# Patient Record
Sex: FEMALE | Race: WHITE | NOT HISPANIC OR LATINO
[De-identification: names, ages, dates, MRNs, and addresses within clinical notes are randomized per-mention and may not be internally consistent; named-entity substitution may affect disease eponyms.]

---

## 2021-03-24 ENCOUNTER — APPOINTMENT (OUTPATIENT)
Dept: OBGYN | Facility: CLINIC | Age: 86
End: 2021-03-24
Payer: MEDICARE

## 2021-03-24 VITALS — DIASTOLIC BLOOD PRESSURE: 80 MMHG | SYSTOLIC BLOOD PRESSURE: 134 MMHG

## 2021-03-24 VITALS
BODY MASS INDEX: 26.5 KG/M2 | WEIGHT: 144 LBS | HEIGHT: 62 IN | SYSTOLIC BLOOD PRESSURE: 155 MMHG | DIASTOLIC BLOOD PRESSURE: 78 MMHG

## 2021-03-24 DIAGNOSIS — Z82.49 FAMILY HISTORY OF ISCHEMIC HEART DISEASE AND OTHER DISEASES OF THE CIRCULATORY SYSTEM: ICD-10-CM

## 2021-03-24 DIAGNOSIS — Z83.3 FAMILY HISTORY OF DIABETES MELLITUS: ICD-10-CM

## 2021-03-24 DIAGNOSIS — E07.9 DISORDER OF THYROID, UNSPECIFIED: ICD-10-CM

## 2021-03-24 DIAGNOSIS — Z82.3 FAMILY HISTORY OF STROKE: ICD-10-CM

## 2021-03-24 DIAGNOSIS — Z00.00 ENCOUNTER FOR GENERAL ADULT MEDICAL EXAMINATION W/OUT ABNORMAL FINDINGS: ICD-10-CM

## 2021-03-24 PROCEDURE — 99203 OFFICE O/P NEW LOW 30 MIN: CPT | Mod: 25

## 2021-03-24 PROCEDURE — G0101: CPT

## 2021-03-24 PROCEDURE — 99072 ADDL SUPL MATRL&STAF TM PHE: CPT

## 2021-03-25 NOTE — PHYSICAL EXAM
[Appropriately responsive] : appropriately responsive [Alert] : alert [No Acute Distress] : no acute distress [No Lymphadenopathy] : no lymphadenopathy [Regular Rate Rhythm] : regular rate rhythm [No Murmurs] : no murmurs [Clear to Auscultation B/L] : clear to auscultation bilaterally [Soft] : soft [Non-tender] : non-tender [Non-distended] : non-distended [No HSM] : No HSM [No Lesions] : no lesions [No Mass] : no mass [Oriented x3] : oriented x3 [Examination Of The Breasts] : a normal appearance [No Masses] : no breast masses were palpable [Labia Majora] : normal [Labia Minora] : normal [Atrophy] : atrophy [Dry Mucosa] : dry mucosa [Normal] : normal [Uterine Adnexae] : normal [FreeTextEntry2] : sebacious cyst noted on left labia majora [FreeTextEntry4] : introitus w small excoriations at 6 oclock, depigmented there as well.

## 2021-03-25 NOTE — DISCUSSION/SUMMARY
[FreeTextEntry1] : annual exam. nl breast exam. atrophic vaginitis.\par vag cultures performed\par lotrisone prescribed.

## 2021-03-25 NOTE — HISTORY OF PRESENT ILLNESS
[FreeTextEntry1] : 87 yo pt here for annual exam after many years, concerned about lump on labia and delicacy at vagina , getting papercutlike tears w friction. declines pap .

## 2021-04-07 LAB
BACTERIA GENITAL AEROBE CULT: NORMAL
CYTOLOGY CVX/VAG DOC THIN PREP: NORMAL

## 2021-04-15 ENCOUNTER — APPOINTMENT (OUTPATIENT)
Dept: OBGYN | Facility: CLINIC | Age: 86
End: 2021-04-15
Payer: MEDICARE

## 2021-04-15 VITALS
WEIGHT: 139 LBS | HEIGHT: 62 IN | DIASTOLIC BLOOD PRESSURE: 84 MMHG | BODY MASS INDEX: 25.58 KG/M2 | SYSTOLIC BLOOD PRESSURE: 149 MMHG

## 2021-04-15 DIAGNOSIS — N95.2 POSTMENOPAUSAL ATROPHIC VAGINITIS: ICD-10-CM

## 2021-04-15 DIAGNOSIS — N90.7 VULVAR CYST: ICD-10-CM

## 2021-04-15 PROCEDURE — 99214 OFFICE O/P EST MOD 30 MIN: CPT | Mod: 25

## 2021-04-15 PROCEDURE — 99072 ADDL SUPL MATRL&STAF TM PHE: CPT

## 2021-04-15 PROCEDURE — 56605 BIOPSY OF VULVA/PERINEUM: CPT

## 2021-04-15 RX ORDER — ESTRADIOL 0.1 MG/G
0.1 CREAM VAGINAL
Qty: 1 | Refills: 1 | Status: ACTIVE | COMMUNITY
Start: 2021-04-15 | End: 1900-01-01

## 2021-04-17 NOTE — PHYSICAL EXAM
[Labia Majora] : normal [Labia Minora] : normal [Normal] : normal [Uterine Adnexae] : normal [FreeTextEntry1] : 1 cm perinael/left buttock area lesion

## 2021-04-17 NOTE — PROCEDURE
[] : in the Perineum [Size of Biopsy Taken: ___ (mm)] : [unfilled]Umm [Local Anesthesia] : local anesthesia [____ Lidocaine w/Epi] : ~VmL  lidocaine with epinephrine [Punch] : punch biopsy [Silver Nitrate] : silver nitrate [Tolerated Well] : the patient tolerated the procedure well [No Complications] : there were no complications

## 2021-04-17 NOTE — HISTORY OF PRESENT ILLNESS
[FreeTextEntry1] : 87 yo pt here for biopsy of irritated perineal/buttock lesion. also co vaginal orefice discomfort and itching. external vulva getting some relief w lortrisone, requests refill. oliver pt topical exstrogen to make tissue less delicate, kavitha boyd pt, who would like to try it.

## 2021-04-21 LAB — CORE LAB BIOPSY: NORMAL

## 2021-04-28 ENCOUNTER — APPOINTMENT (OUTPATIENT)
Dept: OBGYN | Facility: CLINIC | Age: 86
End: 2021-04-28
Payer: MEDICARE

## 2021-04-28 VITALS — HEIGHT: 62 IN | WEIGHT: 139 LBS | BODY MASS INDEX: 25.58 KG/M2

## 2021-04-28 DIAGNOSIS — B37.3 CANDIDIASIS OF VULVA AND VAGINA: ICD-10-CM

## 2021-04-28 DIAGNOSIS — N90.7 VULVAR CYST: ICD-10-CM

## 2021-04-28 PROCEDURE — 99214 OFFICE O/P EST MOD 30 MIN: CPT

## 2021-04-28 PROCEDURE — 99072 ADDL SUPL MATRL&STAF TM PHE: CPT

## 2021-04-28 RX ORDER — CLOTRIMAZOLE AND BETAMETHASONE DIPROPIONATE 10; .5 MG/G; MG/G
1-0.05 CREAM TOPICAL TWICE DAILY
Qty: 1 | Refills: 0 | Status: ACTIVE | COMMUNITY
Start: 2021-03-24 | End: 1900-01-01

## 2021-04-28 NOTE — PHYSICAL EXAM
[Vulvitis] : vulvitis [Labia Majora] : normal [Labia Minora] : normal [Normal] : normal [Uterine Adnexae] : normal [FreeTextEntry1] : some flaking, erythematous patches. [FreeTextEntry2] : cyst removal incision site healing well, fibrin coating in base of defect.

## 2021-04-28 NOTE — HISTORY OF PRESENT ILLNESS
[FreeTextEntry1] : 87 yo p4 here to fu after vulvar cyst removal and vaginal discomfort. feeling better w topical estrogen use. \par had recurrence of labial itching, responded to lotrisone in past, requests addl meds.

## 2022-08-31 DIAGNOSIS — M25.561 PAIN IN RIGHT KNEE: ICD-10-CM

## 2022-09-02 ENCOUNTER — APPOINTMENT (OUTPATIENT)
Dept: ORTHOPEDIC SURGERY | Facility: CLINIC | Age: 87
End: 2022-09-02

## 2022-09-02 VITALS
TEMPERATURE: 97.9 F | HEIGHT: 62 IN | HEART RATE: 42 BPM | WEIGHT: 145 LBS | BODY MASS INDEX: 26.68 KG/M2 | DIASTOLIC BLOOD PRESSURE: 75 MMHG | SYSTOLIC BLOOD PRESSURE: 145 MMHG

## 2022-09-02 DIAGNOSIS — M17.11 UNILATERAL PRIMARY OSTEOARTHRITIS, RIGHT KNEE: ICD-10-CM

## 2022-09-02 DIAGNOSIS — M71.21 SYNOVIAL CYST OF POPLITEAL SPACE [BAKER], RIGHT KNEE: ICD-10-CM

## 2022-09-02 PROCEDURE — 99203 OFFICE O/P NEW LOW 30 MIN: CPT

## 2022-09-02 PROCEDURE — 73564 X-RAY EXAM KNEE 4 OR MORE: CPT | Mod: RT

## 2022-09-02 NOTE — PHYSICAL EXAM
[de-identified] : GENERAL APPEARANCE: Well nourished and hydrated, pleasant, alert, and oriented x 3. Appears their stated age. \par HEENT: Normocephalic, extraocular eye motion intact. Nasal septum midline. Oral cavity clear. External auditory canal clear. \par RESPIRATORY: Breath sounds clear and audible in all lobes. No wheezing, No accessory muscle use.\par CARDIOVASCULAR: No apparent abnormalities. No lower leg edema. No varicosities. Pedal pulses are palpable.\par NEUROLOGIC: Sensation is normal, no muscle weakness in the upper or lower extremities.\par DERMATOLOGIC: No apparent skin lesions, moist, warm, no rash.\par SPINE: Cervical spine appears normal and moves freely; thoracic spine appears normal and moves freely; lumbosacral spine appears normal and moves freely, normal, nontender.\par MUSCULOSKELETAL: Hands, wrists, and elbows are normal and move freely, shoulders are normal and move freely. \par Psychiatric: Oriented to person, place, and time, insight and judgement were intact and the affect was normal. \par Musculoskeletal:.  Right knee exam shows mild effusion, ROM is 5-1 20 degrees, no instability, no pain with Ayde, medial joint line tenderness.  Small Sultana's cyst is palpated posteriorly\par 5/5 motor strength in bilateral lower extremities. Sensory: Intact in bilateral lower extremities. DTRs: Biceps, brachioradialis, triceps, patellar, ankle and plantar 2+ and symmetric bilaterally. Pulses: dorsalis pedis, posterior tibial, femoral, popliteal, and radial 2+ and symmetric bilaterally. \par Constitutional: Alert and in no acute distress, but well-appearing.  [de-identified] : 4 views of the right knee obtained the office today show no acute fracture dislocation.  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  Popliteal cyst.

## 2022-09-02 NOTE — HISTORY OF PRESENT ILLNESS
[Improving] : improving [4] : an average pain level of 4/10 [9] : a maximum pain level of 9/10 [Intermit.] : ~He/She~ states the symptoms seem to be intermittent [Bending] : worsened by bending [Direct Pressure] : worsened by direct pressure [Sitting] : worsened by sitting [Knee Flexion] : worsened with knee flexion [Acetaminophen] : relieved by acetaminophen [de-identified] : 87-year-old female with past medical history of macular degeneration, bilateral cataracts presents to the office today for initial evaluation of right knee pain.  Patient states that she started experiencing pain behind her right knee about 2 weeks ago, she also had a fall during that time onto her right knee.  She went to urgent care where they did x-rays and referred her for an ultrasound of the right knee.  The ultrasound showed a right  Baker's cyst.  The patient states that the pain behind her knee has been improving, she takes Tylenol daily for the pain.  States it is a 4 out of 10 today.  The pain is localized to the back of the knee, does not radiate up to the hip.  Exacerbated by sitting for too long, knee flexion.  Denies use of assistive device for ambulation.  Has never been to PT.  Denies any numbness/tingling, locking popping or buckling of the right knee, hip pain.

## 2022-09-02 NOTE — DISCUSSION/SUMMARY
[de-identified] : Patient is an 87-year-old female with right knee osteoarthritis presenting today for initial evaluation.  I did discuss with her that her Baker's cyst is a benign finding and is not causing her her symptoms.  She does have mild symptoms from her significant arthritis on her x-ray.  She is having improvement with Tylenol.  I therefore recommend she continue with conservative treatment this time.  She is to take Tylenol as needed for the pain.  I given her prescription for physical therapy.  I will see her back on an as-needed basis possible cortisone injection in her knee at that time.  All questions were asked and answered.

## 2022-09-23 ENCOUNTER — APPOINTMENT (OUTPATIENT)
Dept: ORTHOPEDIC SURGERY | Facility: CLINIC | Age: 87
End: 2022-09-23

## 2024-03-28 NOTE — REASON FOR VISIT
assessed within spinal precautions/bilateral UE Active ROM was WFL  (within functional limits) [Initial] : an initial consultation for [FreeTextEntry2] : lump on labias, getting bigger